# Patient Record
Sex: MALE | Race: WHITE | NOT HISPANIC OR LATINO | Employment: FULL TIME | URBAN - NONMETROPOLITAN AREA
[De-identification: names, ages, dates, MRNs, and addresses within clinical notes are randomized per-mention and may not be internally consistent; named-entity substitution may affect disease eponyms.]

---

## 2025-03-25 ENCOUNTER — OFFICE VISIT (OUTPATIENT)
Dept: PRIMARY CARE | Facility: CLINIC | Age: 45
End: 2025-03-25
Payer: COMMERCIAL

## 2025-03-25 VITALS
DIASTOLIC BLOOD PRESSURE: 78 MMHG | SYSTOLIC BLOOD PRESSURE: 110 MMHG | HEIGHT: 71 IN | BODY MASS INDEX: 27.33 KG/M2 | WEIGHT: 195.2 LBS | HEART RATE: 84 BPM

## 2025-03-25 DIAGNOSIS — J35.8 TONSIL STONE: ICD-10-CM

## 2025-03-25 DIAGNOSIS — J45.20 MILD INTERMITTENT REACTIVE AIRWAY DISEASE WITHOUT COMPLICATION (HHS-HCC): ICD-10-CM

## 2025-03-25 DIAGNOSIS — R93.89 ABNORMAL CHEST XRAY: Primary | ICD-10-CM

## 2025-03-25 PROCEDURE — 99214 OFFICE O/P EST MOD 30 MIN: CPT | Performed by: FAMILY MEDICINE

## 2025-03-25 PROCEDURE — 3008F BODY MASS INDEX DOCD: CPT | Performed by: FAMILY MEDICINE

## 2025-03-25 RX ORDER — ALBUTEROL SULFATE 90 UG/1
2 INHALANT RESPIRATORY (INHALATION) EVERY 4 HOURS PRN
COMMUNITY
Start: 2024-12-03 | End: 2025-03-25 | Stop reason: SDUPTHER

## 2025-03-25 RX ORDER — ALBUTEROL SULFATE 90 UG/1
2 INHALANT RESPIRATORY (INHALATION) EVERY 4 HOURS PRN
Qty: 18 G | Refills: 1 | Status: SHIPPED | OUTPATIENT
Start: 2025-03-25

## 2025-03-25 ASSESSMENT — PATIENT HEALTH QUESTIONNAIRE - PHQ9
SUM OF ALL RESPONSES TO PHQ9 QUESTIONS 1 AND 2: 0
1. LITTLE INTEREST OR PLEASURE IN DOING THINGS: NOT AT ALL
2. FEELING DOWN, DEPRESSED OR HOPELESS: NOT AT ALL

## 2025-03-25 ASSESSMENT — PAIN SCALES - GENERAL: PAINLEVEL_OUTOF10: 0-NO PAIN

## 2025-03-25 NOTE — PATIENT INSTRUCTIONS
Department of Anesthesiology  Postprocedure Note    Patient: Luther Lobo  MRN: [id number]  YOB: 1964  Date of evaluation: 10/14/2022      Procedure Summary     Date: 10/14/22 Room / Location: [address] [place] [address] / [address] [place] [address]    Anesthesia Start: 4358 Anesthesia Stop: 6185    Procedure: LEFT EYE, PARS PLANA VITRECTOMY,INTERNAL LIMITING MEMBRANE PEEL,INDOCYANINE GREEN, GAS FLUID EXCHANGE, 25G (Left: Eye) Diagnosis:       Macular hole of left eye      (Macular hole of left eye [H35.342])    Surgeons: Ermelinda Harden MD Responsible Provider: Rosa Gerber MD    Anesthesia Type: MAC ASA Status: 2          Anesthesia Type: No value filed.     Warren Phase I: Warren Score: 10    Warren Phase II: Warren Score: 9      Anesthesia Post Evaluation    Patient location during evaluation: bedside  Patient participation: complete - patient participated  Level of consciousness: awake  Pain score: 0  Airway patency: patent  Nausea & Vomiting: no nausea and no vomiting  Complications: no  Cardiovascular status: hemodynamically stable  Respiratory status: acceptable  Hydration status: euvolemic Repeat the CXR. You can get a copy of the first one for [facility] to take with you.     Schedule with DR. Borden for the tonsil stones.     Use the inhaler as needed for cough/wheezing.

## 2025-03-25 NOTE — PROGRESS NOTES
"Subjective   Patient ID: Lonny Robles is a 44 y.o. male who presents for Tonsil stones and Lung nodule.  Repeat CXR.    HPI   He presents today with a few issues.  First, he had a chest x-ray back in December at the clinic and had a infiltrate in the mid left region of the lung.  They recommended a follow-up scan on that.  He is feeling well right now.  No cough.  No shortness of breath.    Also, he tends to get tonsil stones on the left side.  He is wondering what he can do for that.  He is able usually clear them but they continue to occur.    He is refill on the albuterol inhaler which he just uses intermittently.    Review of Systems    Objective   /78   Pulse 84   Ht 1.803 m (5' 11\")   Wt 88.5 kg (195 lb 3.2 oz)   BMI 27.22 kg/m²     Physical Exam  Constitutional:       Appearance: Normal appearance. He is not ill-appearing.   HENT:      Mouth/Throat:      Comments: Tonsils are a bit enlarged and do have some cavities on them.  Larger folds on them.  I do not see any stones.  No cervical lymphadenopathy.  Pulmonary:      Effort: Pulmonary effort is normal.      Breath sounds: Normal breath sounds.   Neurological:      Mental Status: He is alert.   Psychiatric:         Mood and Affect: Mood normal.         Behavior: Behavior normal.         Assessment/Plan   Diagnoses and all orders for this visit:  Abnormal chest xray  -     XR chest 2 views; Future  Tonsil stone  -     Referral to ENT; Future  Mild intermittent reactive airway disease without complication ([facility])  -     albuterol 90 mcg/actuation inhaler; Inhale 2 puffs every 4 hours if needed for wheezing or shortness of breath.  Will repeat the chest x-ray.  He can get a copy of the lung completed in clinic and take that with him.  Referred him to Dr. Borden for evaluation of tonsil stones.  Will continue the albuterol as needed.       "

## 2025-05-23 ENCOUNTER — OFFICE VISIT (OUTPATIENT)
Dept: PRIMARY CARE | Facility: CLINIC | Age: 45
End: 2025-05-23
Payer: COMMERCIAL

## 2025-05-23 VITALS
SYSTOLIC BLOOD PRESSURE: 144 MMHG | WEIGHT: 203.8 LBS | DIASTOLIC BLOOD PRESSURE: 98 MMHG | BODY MASS INDEX: 28.42 KG/M2 | HEART RATE: 96 BPM

## 2025-05-23 DIAGNOSIS — J45.20 MILD INTERMITTENT ASTHMA, UNCOMPLICATED (HHS-HCC): ICD-10-CM

## 2025-05-23 DIAGNOSIS — I10 PRIMARY HYPERTENSION: Primary | ICD-10-CM

## 2025-05-23 PROCEDURE — 93000 ELECTROCARDIOGRAM COMPLETE: CPT | Performed by: FAMILY MEDICINE

## 2025-05-23 PROCEDURE — 99213 OFFICE O/P EST LOW 20 MIN: CPT | Performed by: FAMILY MEDICINE

## 2025-05-23 PROCEDURE — 3077F SYST BP >= 140 MM HG: CPT | Performed by: FAMILY MEDICINE

## 2025-05-23 PROCEDURE — 3080F DIAST BP >= 90 MM HG: CPT | Performed by: FAMILY MEDICINE

## 2025-05-23 RX ORDER — AMLODIPINE BESYLATE 5 MG/1
5 TABLET ORAL DAILY
Qty: 30 TABLET | Refills: 5 | Status: SHIPPED | OUTPATIENT
Start: 2025-05-23 | End: 2025-11-19

## 2025-05-23 ASSESSMENT — PATIENT HEALTH QUESTIONNAIRE - PHQ9
SUM OF ALL RESPONSES TO PHQ9 QUESTIONS 1 AND 2: 0
2. FEELING DOWN, DEPRESSED OR HOPELESS: NOT AT ALL
1. LITTLE INTEREST OR PLEASURE IN DOING THINGS: NOT AT ALL

## 2025-05-23 ASSESSMENT — PAIN SCALES - GENERAL: PAINLEVEL_OUTOF10: 0-NO PAIN

## 2025-05-23 ASSESSMENT — COLUMBIA-SUICIDE SEVERITY RATING SCALE - C-SSRS
1. IN THE PAST MONTH, HAVE YOU WISHED YOU WERE DEAD OR WISHED YOU COULD GO TO SLEEP AND NOT WAKE UP?: NO
2. HAVE YOU ACTUALLY HAD ANY THOUGHTS OF KILLING YOURSELF?: NO
6. HAVE YOU EVER DONE ANYTHING, STARTED TO DO ANYTHING, OR PREPARED TO DO ANYTHING TO END YOUR LIFE?: NO

## 2025-05-23 NOTE — PROGRESS NOTES
Subjective   Patient ID: Lonny Robles is a 44 y.o. male who presents for Hypertension (145/102  this morning at work).    HPI   He presents today for elevated blood pressure readings.  High readings at the dentist, 140s to 150s over 90s.  Blood pressure is elevated here today.  He does not have a meter at home to check just yet.  He states he is under a lot of stress lately.    Review of Systems  No chest pain or shortness of breath.  No palpitations.  No swelling the feet.    Objective   BP (!) 144/98   Pulse 96   Wt 92.4 kg (203 lb 12.8 oz)   BMI 28.42 kg/m²     Physical Exam  Constitutional:       Appearance: Normal appearance.   Neck:      Thyroid: No thyromegaly or thyroid tenderness.      Vascular: No carotid bruit.   Cardiovascular:      Rate and Rhythm: Normal rate and regular rhythm.      Heart sounds: No murmur heard.  Pulmonary:      Effort: Pulmonary effort is normal.      Breath sounds: Normal breath sounds.   Musculoskeletal:      Cervical back: Neck supple.   Neurological:      Mental Status: He is alert.   Psychiatric:         Mood and Affect: Mood normal.         Assessment/Plan   Diagnoses and all orders for this visit:  Primary hypertension  Will begin the amlodipine since his blood pressures have been consistently elevated.  Did discuss that much of this may be related to stress.  As the stress clears we may be able to come off of this.  He will let me know over the next 2 weeks how his blood pressures run.  He is getting get machine to check his blood pressures at home.

## 2025-05-23 NOTE — PATIENT INSTRUCTIONS
Your EKG looks good.  You may start amlodipine, 5 mg daily.  Will see how your blood pressures do over the next 2 weeks.  If you can get a machine to follow his at home that would be very helpful. [employer] machines are very good.  Check it when you are resting.   Check the blood test fasting.